# Patient Record
Sex: FEMALE | Race: WHITE | NOT HISPANIC OR LATINO | Employment: FULL TIME | URBAN - METROPOLITAN AREA
[De-identification: names, ages, dates, MRNs, and addresses within clinical notes are randomized per-mention and may not be internally consistent; named-entity substitution may affect disease eponyms.]

---

## 2017-02-06 ENCOUNTER — LAB (OUTPATIENT)
Dept: LAB | Facility: HOSPITAL | Age: 52
End: 2017-02-06

## 2017-02-06 DIAGNOSIS — E03.9 UNSPECIFIED HYPOTHYROIDISM: Primary | ICD-10-CM

## 2017-02-06 DIAGNOSIS — I10 ESSENTIAL HYPERTENSION, MALIGNANT: ICD-10-CM

## 2017-02-06 LAB
ALBUMIN SERPL-MCNC: 4.8 G/DL (ref 3.2–4.8)
ALBUMIN/GLOB SERPL: 1.8 G/DL (ref 1.5–2.5)
ALP SERPL-CCNC: 87 U/L (ref 25–100)
ALT SERPL W P-5'-P-CCNC: 30 U/L (ref 7–40)
ANION GAP SERPL CALCULATED.3IONS-SCNC: 7 MMOL/L (ref 3–11)
AST SERPL-CCNC: 22 U/L (ref 0–33)
BACTERIA UR QL AUTO: NORMAL /HPF
BILIRUB SERPL-MCNC: 0.2 MG/DL (ref 0.3–1.2)
BILIRUB UR QL STRIP: NEGATIVE
BUN BLD-MCNC: 15 MG/DL (ref 9–23)
BUN/CREAT SERPL: 18.8 (ref 7–25)
CALCIUM SPEC-SCNC: 10 MG/DL (ref 8.7–10.4)
CHLORIDE SERPL-SCNC: 101 MMOL/L (ref 99–109)
CLARITY UR: CLEAR
CO2 SERPL-SCNC: 33 MMOL/L (ref 20–31)
COLOR UR: YELLOW
CORTIS SERPL-MCNC: 9.3 MCG/DL
CREAT BLD-MCNC: 0.8 MG/DL (ref 0.6–1.3)
GFR SERPL CREATININE-BSD FRML MDRD: 76 ML/MIN/1.73
GLOBULIN UR ELPH-MCNC: 2.7 GM/DL
GLUCOSE BLD-MCNC: 86 MG/DL (ref 70–100)
GLUCOSE UR STRIP-MCNC: NEGATIVE MG/DL
HBA1C MFR BLD: 6.2 % (ref 4.8–5.6)
HGB UR QL STRIP.AUTO: NEGATIVE
HYALINE CASTS UR QL AUTO: NORMAL /LPF
KETONES UR QL STRIP: NEGATIVE
LEUKOCYTE ESTERASE UR QL STRIP.AUTO: NEGATIVE
NITRITE UR QL STRIP: NEGATIVE
PH UR STRIP.AUTO: 5.5 [PH] (ref 5–8)
POTASSIUM BLD-SCNC: 4.2 MMOL/L (ref 3.5–5.5)
PROT SERPL-MCNC: 7.5 G/DL (ref 5.7–8.2)
PROT UR QL STRIP: NEGATIVE
RBC # UR: NORMAL /HPF
REF LAB TEST METHOD: NORMAL
SODIUM BLD-SCNC: 141 MMOL/L (ref 132–146)
SP GR UR STRIP: 1.01 (ref 1–1.03)
SQUAMOUS #/AREA URNS HPF: NORMAL /HPF
T3RU NFR SERPL: 29.2 % (ref 23–37)
T4 FREE SERPL-MCNC: 1.36 NG/DL (ref 0.89–1.76)
T4 SERPL-MCNC: 12.1 MCG/DL (ref 4.7–11.4)
TSH SERPL DL<=0.05 MIU/L-ACNC: 1.58 MIU/ML (ref 0.35–5.35)
UROBILINOGEN UR QL STRIP: NORMAL
WBC UR QL AUTO: NORMAL /HPF

## 2017-02-06 PROCEDURE — 36415 COLL VENOUS BLD VENIPUNCTURE: CPT

## 2017-02-06 PROCEDURE — 80053 COMPREHEN METABOLIC PANEL: CPT | Performed by: INTERNAL MEDICINE

## 2017-02-06 PROCEDURE — 84443 ASSAY THYROID STIM HORMONE: CPT | Performed by: INTERNAL MEDICINE

## 2017-02-06 PROCEDURE — 84479 ASSAY OF THYROID (T3 OR T4): CPT | Performed by: INTERNAL MEDICINE

## 2017-02-06 PROCEDURE — 84402 ASSAY OF FREE TESTOSTERONE: CPT | Performed by: INTERNAL MEDICINE

## 2017-02-06 PROCEDURE — 84439 ASSAY OF FREE THYROXINE: CPT | Performed by: INTERNAL MEDICINE

## 2017-02-06 PROCEDURE — 82533 TOTAL CORTISOL: CPT | Performed by: INTERNAL MEDICINE

## 2017-02-06 PROCEDURE — 83036 HEMOGLOBIN GLYCOSYLATED A1C: CPT | Performed by: INTERNAL MEDICINE

## 2017-02-06 PROCEDURE — 86800 THYROGLOBULIN ANTIBODY: CPT | Performed by: INTERNAL MEDICINE

## 2017-02-06 PROCEDURE — 81001 URINALYSIS AUTO W/SCOPE: CPT | Performed by: INTERNAL MEDICINE

## 2017-02-06 PROCEDURE — 84403 ASSAY OF TOTAL TESTOSTERONE: CPT | Performed by: INTERNAL MEDICINE

## 2017-02-06 PROCEDURE — 86376 MICROSOMAL ANTIBODY EACH: CPT | Performed by: INTERNAL MEDICINE

## 2017-02-06 PROCEDURE — 84432 ASSAY OF THYROGLOBULIN: CPT | Performed by: INTERNAL MEDICINE

## 2017-02-06 PROCEDURE — 84436 ASSAY OF TOTAL THYROXINE: CPT | Performed by: INTERNAL MEDICINE

## 2017-02-06 PROCEDURE — 82024 ASSAY OF ACTH: CPT | Performed by: INTERNAL MEDICINE

## 2017-02-07 LAB
ACTH PLAS-MCNC: 22.1 PG/ML (ref 7.2–63.3)
THYROGLOB AB SERPL-ACNC: <1 IU/ML (ref 0–0.9)
THYROPEROXIDASE AB SERPL-ACNC: 9 IU/ML (ref 0–34)

## 2017-02-08 LAB
TESTOST FREE SERPL-MCNC: 3.1 PG/ML (ref 0–4.2)
TESTOST SERPL-MCNC: 30 NG/DL (ref 3–41)

## 2017-02-12 LAB — THYROGLOBULIN (TG-RIA): 6.4 NG/ML

## 2018-01-22 ENCOUNTER — DOCUMENTATION (OUTPATIENT)
Dept: BARIATRICS/WEIGHT MGMT | Facility: CLINIC | Age: 53
End: 2018-01-22

## 2018-01-31 ENCOUNTER — DOCUMENTATION (OUTPATIENT)
Dept: BARIATRICS/WEIGHT MGMT | Facility: CLINIC | Age: 53
End: 2018-01-31

## 2018-01-31 ENCOUNTER — OFFICE VISIT (OUTPATIENT)
Dept: BARIATRICS/WEIGHT MGMT | Facility: CLINIC | Age: 53
End: 2018-01-31

## 2018-01-31 ENCOUNTER — RESULTS ENCOUNTER (OUTPATIENT)
Dept: BARIATRICS/WEIGHT MGMT | Facility: CLINIC | Age: 53
End: 2018-01-31

## 2018-01-31 VITALS
HEIGHT: 64 IN | HEART RATE: 101 BPM | BODY MASS INDEX: 44.05 KG/M2 | SYSTOLIC BLOOD PRESSURE: 156 MMHG | WEIGHT: 258 LBS | OXYGEN SATURATION: 99 % | DIASTOLIC BLOOD PRESSURE: 103 MMHG | RESPIRATION RATE: 18 BRPM | TEMPERATURE: 98.2 F

## 2018-01-31 DIAGNOSIS — K59.00 CONSTIPATION, UNSPECIFIED CONSTIPATION TYPE: ICD-10-CM

## 2018-01-31 DIAGNOSIS — R53.83 FATIGUE, UNSPECIFIED TYPE: ICD-10-CM

## 2018-01-31 DIAGNOSIS — R10.13 DYSPEPSIA: Primary | ICD-10-CM

## 2018-01-31 DIAGNOSIS — M25.50 ARTHRALGIA, UNSPECIFIED JOINT: ICD-10-CM

## 2018-01-31 DIAGNOSIS — R10.13 DYSPEPSIA: ICD-10-CM

## 2018-01-31 DIAGNOSIS — E66.01 OBESITY, CLASS III, BMI 40-49.9 (MORBID OBESITY) (HCC): ICD-10-CM

## 2018-01-31 DIAGNOSIS — K21.9 GASTROESOPHAGEAL REFLUX DISEASE, ESOPHAGITIS PRESENCE NOT SPECIFIED: ICD-10-CM

## 2018-01-31 DIAGNOSIS — R60.0 LOWER EXTREMITY EDEMA: ICD-10-CM

## 2018-01-31 DIAGNOSIS — R29.818 SUSPECTED SLEEP APNEA: ICD-10-CM

## 2018-01-31 DIAGNOSIS — I10 HYPERTENSION, UNSPECIFIED TYPE: Primary | ICD-10-CM

## 2018-01-31 DIAGNOSIS — F17.200 CURRENT SMOKER: ICD-10-CM

## 2018-01-31 PROCEDURE — 99406 BEHAV CHNG SMOKING 3-10 MIN: CPT | Performed by: PHYSICIAN ASSISTANT

## 2018-01-31 PROCEDURE — 99205 OFFICE O/P NEW HI 60 MIN: CPT | Performed by: PHYSICIAN ASSISTANT

## 2018-01-31 NOTE — PROGRESS NOTES
[facility] BARIATRIC SURGERY  [address]  [facility] [address]  [phone]      Patient  Name:  Francie Honeycutt  :  1965      Date of Visit: 2018      Chief Complaint:  weight gain; unable to maintain weight loss    History of Present Illness:  Francie Honeycutt is a 53 y.o. female who presents today for evaluation, education and consultation regarding weight loss surgery. The patient is interested in sleeve gastrectomy.     Francie has been overweight for at least 30 years, has been 35 pounds or more overweight for at least 30 years, has been 100 pounds or more overweight for 15 or more years and started dieting at age 21.      Previous diet attempts include: Low Carbohydrate, Calorie Counting, Leidy's Diet, Fasting and Slim Fast; Diet [facility] and [facility] weight loss; Ionamin/Adipex.  The most weight Francie lost was 60 pounds on Diet pills but was only able to maintain that weight loss for 1 year.  Her maximum lifetime weight is 260 pounds.    As above, patient has been overweight for many years, with numerous failed dietary/weight loss attempts.  She now has obesity related comorbidities and as such has decided to pursue weight loss surgery. She knows people who have had the LSG and are doing well.      Past Medical History:   Diagnosis Date   • Constipation    • Current smoker    • GERD (gastroesophageal reflux disease)     OTC pepto PRN.  No h/o EGD or h pylori.    • Hypertension    • Joint pain     knees, backs, hips. PRN NSAIDS.  No injections.    • Lower extremity edema     wtih driving extended periods   • Suspected sleep apnea    • Urinary urgency      Past Surgical History:   Procedure Laterality Date   • COLONOSCOPY  2017    benign polyps   • VAGINAL HYSTERECTOMY      partial, ovaries remain       Allergies   Allergen Reactions   • Oxycodone-Acetaminophen GI Intolerance     Phenergan prior to dosing helps control symptoms. Has tolerated morphine.    • Oxycontin  [Oxycodone Hcl] GI Intolerance     No current outpatient prescriptions on file.    Social History     Social History   • Marital status:      Spouse name: N/A   • Number of children: N/A   • Years of education: N/A     Occupational History   • Not on file.     Social History Main Topics   • Smoking status: Current Every Day Smoker     Packs/day: 1.00     Years: 32.00     Types: Cigarettes   • Smokeless tobacco: Never Used      Comment:  smokes cigars. No smoke in house.    • Alcohol use No   • Drug use: No   • Sexual activity: Not on file      Comment: no hormones     Other Topics Concern   • Not on file     Social History Narrative    Lives in [facility] with .  Works at [employer] as .        Family History   Problem Relation Age of Onset   • Colon cancer Mother    • Heart attack Father    • Heart disease Father    • Stroke Father    • Hypertension Father    • Diabetes Brother    • Hypertension Brother    • Colon cancer Maternal Grandfather        Review of Systems:  Constitutional:  The patient reports fatigue, weight gain and denies fevers and chills.  Cardiovascular:  The patient reports HTN, edema and denies HLD, CP, MI, heart disease and DVT.  Respiratory:  The patient reports suspected sleep apnea and denies asthma and PE.  Gastrointestinal:  The patient reports heartburn, constipation and denies pancreatitis, liver disease, IBS and gallbladder issues.  Genitourinary:  The patient denies renal insufficiency.    Musculoskeletal:  The patient reports joint pain, back pain and denies fibromyalgia, arthritis, autoimmune disease.  Neurological:  The patient reports none and denies seizure and stroke.  Psychiatric:  The patient reports none and denies anxiety, depression and bipolar disorder.  Endocrine:  The patient reports none and denies glucose intolerance, diabetes, thyroid disease, gout.  Hematologic:  The patient reports none and denies anemia, bleeding disorder, hx blood  transfusion.  Skin:  The patient denies MRSA.    Physical Exam:  Vital Signs:  Weight: 117 kg (258 lb)   Body mass index is 44.99 kg/(m^2).  Temp: 98.2 °F (36.8 °C)   Heart Rate: 101   BP: (!) 156/103     Physical Exam   Constitutional: She is oriented to person, place, and time. She appears well-developed and well-nourished.   HENT:   Head: Normocephalic.   Mouth/Throat: Oropharynx is clear and moist.   Wearing a wig, thin hair.  Tongue ring.    Eyes: EOM are normal.   Neck: Normal range of motion. Neck supple. No thyromegaly present.   Cardiovascular: Normal rate, regular rhythm and normal heart sounds.    Pulmonary/Chest: Effort normal and breath sounds normal. No respiratory distress. She has no wheezes.   Abdominal: Soft. Bowel sounds are normal. She exhibits no distension. There is no tenderness.   Umbilical ring   Musculoskeletal: Normal range of motion.   Neurological: She is alert and oriented to person, place, and time.   Skin: Skin is warm and dry.   Psychiatric: She has a normal mood and affect. Her behavior is normal.   Vitals reviewed.      Patient Active Problem List   Diagnosis   • Hypertension   • Suspected sleep apnea   • Lower extremity edema   • Current smoker   • Constipation   • Urinary urgency   • Joint pain   • GERD (gastroesophageal reflux disease)     Assessment:    Francie Honeycutt is a 53 y.o. year old female with medically complicated obesity pursuing sleeve gastrectomy.    Weight loss surgery is deemed medically necessary given the following obesity related comorbidities including sleep apnea, hypertension, back pain, knee pain, GERD, edema and tobacco use with current Weight: 117 kg (258 lb) and Body mass index is 44.99 kg/(m^2)..    Plan:  The consultation plan and program requirements were reviewed with the patient.  The patient has been advised that a letter of medical support must be obtained from her primary care physician or referring provider. A psychological evaluation will be  arranged.  A nutritional evaluation will be performed.  The patient was advised to start a high protein and low carbohydrate diet.  Necessary lifestyle modifications were discussed.  Instructions on how to access GRETCHEN was given to the patient.  GRETCHEN is an internet based educational video that explains the surgical procedure chosen and answers basic questions regarding that procedure.     Preoperative testing will include: CBC, CMP, Fasting Lipids, TSH, HgA1C, H.Pylori, Pulmonary Function Testing, Urine Anabasine, CXR, EKG and EGD.     Additional preop clearances required prior to surgery: Cardiac.  We will schedule with [facility].     Patient understands that bariatric surgery is not cosmetic surgery but rather a tool to help make a lifelong commitment to lifestyle changes including diet, exercise, behavior modifications, and healthy habits.    I advised the patient of the risks in continuing to use tobacco in relation to WLS- including second hand smoke exposure, and the patient has expressed the willingness to quit.      During this visit, I spent 3-10 mintues counseling the patient regarding smoking cessation.       Letitia Gill PA-C

## 2018-01-31 NOTE — PROGRESS NOTES
"Weight Loss Surgery  Presurgical Nutrition Assessment     Franice Honeycutt  01/31/2018  [id number]  [id number]  1965  female    Surgery desired: Sleeve Gastrectomy  Ht 161.3 cm (63.5\"); Wt 117 kg (258 #); BMI 45  Past Medical History:   Diagnosis Date   • Constipation    • Current smoker    • GERD (gastroesophageal reflux disease)     OTC pepto PRN.  No h/o EGD or h pylori.    • Hypertension    • Joint pain     knees, backs, hips. PRN NSAIDS.  No injections.    • Lower extremity edema     wtih driving extended periods   • Suspected sleep apnea    • Urinary urgency      Past Surgical History:   Procedure Laterality Date   • COLONOSCOPY  01/2017    benign polyps   • VAGINAL HYSTERECTOMY  2002    partial, ovaries remain     Allergies   Allergen Reactions   • Oxycodone-Acetaminophen GI Intolerance     Phenergan prior to dosing helps control symptoms. Has tolerated morphine.    • Oxycontin [Oxycodone Hcl] GI Intolerance     No current outpatient prescriptions on file.      Nutrition Assessment    Estimated energy needs:  1760 kcal    Estimated calories for weight loss:  1250 kcal    IBW (Pounds):  145 #        Excess body weight (Pounds):  115 #       Nutrition Recall  24 Hour recall: (B) (L) (D) -  Reviewed and discussed with patient. 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  Drinks no soda or sweet tea, but relies on coffee/c sugar & 1/2 & 1/2. Fills a travel mug with this.  Determined she will need to change to an acceptable tea, as sugar is necessary for her to drink coffee.Food hx indicated that a 2:30-3 AM breakfast was the coffee and 1 banana.  Snacks on premade nut & cheese snack packs /c 7 g protein.  7:30 pm dinner was a large plateful of pt roast, carrots & mashed potatoes. States she has eaten large amts since childhood and will have to unlearn eating habits.       Exercise  rarely - c/o knee, back, foot, neck & hip pain.  Job necessitates her sitting in a car for long " distances.      Education    Provided information packet re:  Sleeve Gastrectomy; .  1. Reviewed guidelines for higher protein, limited carbohydrate diet to promote weight loss.  Encouraged patient to incorporate these principles of healthy eating from now until approximately 2 weeks prior to bariatric surgery date, when an even lower carbohydrate “liver-shrinking” regimen will be followed. (Information sheet re pre-op diet given).  Explained that after recovery from surgery this diet will again be followed to ensure further loss and for weight maintenance.    2. Encouraged patient to choose an acceptable protein supplement powder or shake for post-surgery liquid diet.  Provided product guidelines and examples.    3. Explained importance of goal setting to help in changing eating behaviors that are not conducive to weight loss.  Targeted several on a worksheet which also included spaces for patient to work on issues specific to them.  4. Provided follow-up options for support, including contact information for dietitians here, if desired.  Web-based support information and apps for smart phones and computers given.  Noted that monthly support group is offered at this clinic, and that support is associated with successful weight loss.    Recommend that team proceed with surgery and follow per protocol.      Nutrition Goals   Dietary Guidelines per information packet as described above  Protein goal:  grams per day   Carbohydrate goal:  100-140 grams per day  Eliminate soda, sweet tea, etc.     Exercise Goals  Continue current exercise routine   Add 15-30 minutes of activity per day as tolerated      Maye Bazzi RD  01/31/2018  3:43 PM

## 2018-02-22 ENCOUNTER — OUTSIDE FACILITY SERVICE (OUTPATIENT)
Dept: BARIATRICS/WEIGHT MGMT | Facility: CLINIC | Age: 53
End: 2018-02-22

## 2018-02-22 ENCOUNTER — LAB REQUISITION (OUTPATIENT)
Dept: LAB | Facility: HOSPITAL | Age: 53
End: 2018-02-22

## 2018-02-22 DIAGNOSIS — K21.9 GASTRO-ESOPHAGEAL REFLUX DISEASE WITHOUT ESOPHAGITIS: ICD-10-CM

## 2018-02-22 PROCEDURE — 88305 TISSUE EXAM BY PATHOLOGIST: CPT | Performed by: SURGERY

## 2018-02-22 PROCEDURE — 88312 SPECIAL STAINS GROUP 1: CPT | Performed by: SURGERY

## 2018-02-22 PROCEDURE — 43239 EGD BIOPSY SINGLE/MULTIPLE: CPT | Performed by: SURGERY

## 2018-02-23 LAB
CYTO UR: NORMAL
LAB AP CASE REPORT: NORMAL
LAB AP CLINICAL INFORMATION: NORMAL
Lab: NORMAL
PATH REPORT.FINAL DX SPEC: NORMAL
PATH REPORT.GROSS SPEC: NORMAL

## 2018-02-28 RX ORDER — CLARITHROMYCIN 500 MG/1
500 TABLET, COATED ORAL 2 TIMES DAILY
Qty: 20 TABLET | Refills: 0 | Status: SHIPPED | OUTPATIENT
Start: 2018-02-28 | End: 2018-03-10

## 2018-02-28 RX ORDER — OMEPRAZOLE 20 MG/1
20 CAPSULE, DELAYED RELEASE ORAL 2 TIMES DAILY
Qty: 20 CAPSULE | Refills: 0 | Status: SHIPPED | OUTPATIENT
Start: 2018-02-28 | End: 2018-03-10

## 2018-02-28 RX ORDER — AMOXICILLIN 500 MG/1
1000 CAPSULE ORAL 2 TIMES DAILY
Qty: 40 CAPSULE | Refills: 0 | Status: SHIPPED | OUTPATIENT
Start: 2018-02-28 | End: 2018-03-10

## 2018-04-17 DIAGNOSIS — R10.13 DYSPEPSIA: Primary | ICD-10-CM

## 2018-04-18 ENCOUNTER — CONSULT (OUTPATIENT)
Dept: CARDIOLOGY | Facility: CLINIC | Age: 53
End: 2018-04-18

## 2018-04-18 VITALS
BODY MASS INDEX: 42.51 KG/M2 | HEART RATE: 74 BPM | WEIGHT: 249 LBS | SYSTOLIC BLOOD PRESSURE: 128 MMHG | DIASTOLIC BLOOD PRESSURE: 90 MMHG | HEIGHT: 64 IN

## 2018-04-18 DIAGNOSIS — IMO0001 CLASS 3 OBESITY DUE TO EXCESS CALORIES WITHOUT SERIOUS COMORBIDITY WITH BODY MASS INDEX (BMI) OF 40.0 TO 44.9 IN ADULT: ICD-10-CM

## 2018-04-18 DIAGNOSIS — I10 ESSENTIAL HYPERTENSION: Primary | ICD-10-CM

## 2018-04-18 PROCEDURE — 99204 OFFICE O/P NEW MOD 45 MIN: CPT | Performed by: INTERNAL MEDICINE

## 2018-04-18 PROCEDURE — 93010 ELECTROCARDIOGRAM REPORT: CPT | Performed by: INTERNAL MEDICINE

## 2018-04-18 RX ORDER — LISINOPRIL 20 MG/1
20 TABLET ORAL DAILY
COMMUNITY
Start: 2018-04-04

## 2018-04-18 RX ORDER — VARENICLINE TARTRATE 1 MG/1
1 TABLET, FILM COATED ORAL 2 TIMES DAILY
COMMUNITY
Start: 2018-03-27

## 2018-04-18 RX ORDER — METOPROLOL SUCCINATE 25 MG/1
25 TABLET, EXTENDED RELEASE ORAL DAILY
COMMUNITY
Start: 2018-04-04

## 2018-04-18 NOTE — PROGRESS NOTES
"[facility]  Consultation H&P  Francie Honeycutt  1965  [phone]    VISIT DATE:  04/18/2018    PCP: No Known Provider  [place] [id number]    CC:  Chief Complaint   Patient presents with   • Shortness of Breath     Surgery Clearance       ASSESSMENT:   Diagnosis Plan   1. Essential hypertension     2. Class 3 obesity due to excess calories without serious comorbidity with body mass index (BMI) of 40.0 to 44.9 in adult         PLAN:  Transthoracic echo done evaluate for underlying occult myocardial structural heart disease  If this reveals normal LV systolic function without significant underlying valvular heart disease and patient will be low risk for major perioperative cardiovascular palpitations  Poor precordial R-wave progression likely a normal variant due to body habitus.    History of Present Illness   53-year-old previous smoker who presents for evaluation for bariatric surgery.  Denies chest pain, palpitations.  Blood pressures running less than 130/80 mmHg on medical therapy.  She is compliant with medical therapy.  Shortness of breath and a class II pattern was significant dyspnea with such activities as going up a flight of stairs.  Twelve-lead EKG revealed incomplete right bundle branch block with poor precordial R-wave progression, borderline left axis deviation and premature ventricular contraction.  Stop smoking about one month ago, 20-pack-year history.  Father developed coronary artery disease in his 70s.    PHYSICAL EXAMINATION:  Vitals:    04/18/18 0827   Weight: 113 kg (249 lb)   Height: 161.3 cm (63.5\")     General Appearance:    Alert, cooperative, no distress, appears stated age   Head:    Normocephalic, without obvious abnormality, atraumatic   Eyes:    conjunctiva/corneas clear, EOM's intact, fundi     benign, both eyes   Ears:    Normal TM's and external ear canals, both ears   Nose:   Nares normal, septum midline, mucosa normal, no " drainage    or sinus tenderness   Throat:   Lips, mucosa, and tongue normal; teeth and gums normal   Neck:   Supple, symmetrical, trachea midline, no adenopathy;     thyroid:  no enlargement/tenderness/nodules; no carotid    bruit or JVD   Back:     Symmetric, no curvature, ROM normal, no CVA tenderness   Lungs:     Clear to auscultation bilaterally, respirations unlabored   Chest Wall:    No tenderness or deformity    Heart:    Regular rate and rhythm, S1 and S2 normal, no murmur, rub   or gallop, normal carotid impulse bilaterally without bruit.   Abdomen:     Soft, non-tender, bowel sounds active all four quadrants,     no masses, no organomegaly   Extremities:   Extremities normal, atraumatic, no cyanosis or edema   Pulses:   2+ and symmetric all extremities   Skin:   Skin color, texture, turgor normal, no rashes or lesions   Lymph nodes:   Cervical, supraclavicular, and axillary nodes normal   Neurologic:   normal strength, sensation intact     throughout       Diagnostic Data:    ECG 12 Lead  Date/Time: 4/18/2018 8:47 AM  Performed by: SHITAL LEVIN III  Authorized by: SHITAL LEVIN III   Previous ECG: no previous ECG available  Rhythm: sinus rhythm  Conduction: incomplete RBBB  Other findings: PRWP  Clinical impression: abnormal ECG          No results found for: CHLPL, TRIG, HDL, LDLDIRECT  Lab Results   Component Value Date    GLUCOSE 86 02/06/2017    BUN 15 02/06/2017    CREATININE 0.80 02/06/2017     02/06/2017    K 4.2 02/06/2017     02/06/2017    CO2 33.0 (H) 02/06/2017     Lab Results   Component Value Date    HGBA1C 6.20 (H) 02/06/2017     No results found for: WBC, HGB, HCT, PLT    PROBLEM LIST:  Patient Active Problem List   Diagnosis   • Hypertension   • Suspected sleep apnea   • Lower extremity edema   • Current smoker   • Constipation   • Urinary urgency   • Joint pain   • GERD (gastroesophageal reflux disease)   • Class 3 obesity due to excess calories without serious comorbidity  with body mass index (BMI) of 40.0 to 44.9 in adult       PAST MEDICAL HX  Past Medical History:   Diagnosis Date   • Constipation    • Current smoker    • GERD (gastroesophageal reflux disease)     OTC pepto PRN.  No h/o EGD or h pylori.    • Hypertension    • Joint pain     knees, backs, hips. PRN NSAIDS.  No injections.    • Lower extremity edema     wtih driving extended periods   • Suspected sleep apnea    • Urinary urgency        Allergies  Allergies   Allergen Reactions   • Oxycodone-Acetaminophen GI Intolerance     Phenergan prior to dosing helps control symptoms. Has tolerated morphine.    • Oxycontin [Oxycodone Hcl] GI Intolerance       Current Medications    Current Outpatient Prescriptions:   •  CHANTIX CONTINUING MONTH ISAÍAS 1 MG tablet, Take 1 mg by mouth 2 (Two) Times a Day., Disp: , Rfl:   •  lisinopril (PRINIVIL,ZESTRIL) 20 MG tablet, Take 20 mg by mouth Daily., Disp: , Rfl:   •  metoprolol succinate XL (TOPROL-XL) 25 MG 24 hr tablet, Take 25 mg by mouth Daily., Disp: , Rfl:          ROS  Review of Systems   Constitution: Positive for weight gain.   Respiratory: Positive for shortness of breath and snoring.      All other body systems reviewed and are negative    SOCIAL HX  Social History     Social History   • Marital status:      Spouse name: N/A   • Number of children: N/A   • Years of education: N/A     Occupational History   • Not on file.     Social History Main Topics   • Smoking status: Former Smoker     Packs/day: 1.00     Years: 32.00     Types: Cigarettes     Quit date: 3/14/2018   • Smokeless tobacco: Never Used      Comment:  smokes cigars. No smoke in house.    • Alcohol use No   • Drug use: No   • Sexual activity: Defer      Comment: no hormones     Other Topics Concern   • Not on file     Social History Narrative    Lives in [facility] with .  Works at [employer] as .          FAMILY HX  Family History   Problem Relation Age of Onset   • Colon cancer  Mother    • Heart attack Father    • Heart disease Father    • Stroke Father    • Hypertension Father    • Diabetes Brother    • Hypertension Brother    • Colon cancer Maternal Grandfather              Sudeep Rodrigez III, MD, FACC

## 2018-04-24 LAB — UREA BREATH TEST QL: NEGATIVE
